# Patient Record
Sex: MALE | Race: WHITE | Employment: FULL TIME | URBAN - METROPOLITAN AREA
[De-identification: names, ages, dates, MRNs, and addresses within clinical notes are randomized per-mention and may not be internally consistent; named-entity substitution may affect disease eponyms.]

---

## 2017-02-21 ENCOUNTER — DOCUMENTATION ONLY (OUTPATIENT)
Dept: TRANSPLANT | Facility: CLINIC | Age: 57
End: 2017-02-21

## 2017-02-21 NOTE — PROGRESS NOTES
ISSUES: Hypertension & Immunosuppression           STAFF MESSAGE FROM DR. MCMILLAN     Received a call from [facility] in [place] caring for this patient.  Patient is new to their clinic.  Comes in with BP in 180s systolic a few weeks ago.  Creatinine was ~ 1.9 at that time, about his baseline.  He was started on lisinopril, but creatinine increased to 2.5 and lisinopril was stopped.  Now still with high BP.  Called for advice.     After reviewing multiple options and recommending he see a local nephrologist, I recommended he increase mycophenolate mofetil to 750 mg bid and decrease cyclosporine to 50 mg bid [changed to 50 mg daily from 100 mg daily]. After two weeks, he can stop cyclosporine and stay on mycophenolate mofetil 750 mg bid and prednisone 5 mg daily.     I choose to taper off cyclosporine because of history of gout and to allow him to be on nondihydropyridine CCB (diltiazem) without having to worry about interaction with CNI.     They were going to start this process. Can you please check in with patient about it and make any necessary adjustments.     He is unable to return for a visit and is referred to [facility] transplant center for a visit.        PHONE CALL:  Called patient to discuss Dr. Mcmillan's immunosuppression changes and alert him to expect a call from his primary care clinic about starting a new blood pressure medication. Instructions given regarding immunosuppression changes. Patient's cyclosporine dose was 100 mg daily, not twice a day. Dr. Mcmillan changed his cyclosporine dosing recommendation to 50 mg daily for 2 weeks then discontinue.

## 2017-08-24 ENCOUNTER — TRANSFERRED RECORDS (OUTPATIENT)
Dept: HEALTH INFORMATION MANAGEMENT | Facility: CLINIC | Age: 57
End: 2017-08-24

## 2020-05-11 ENCOUNTER — DOCUMENTATION ONLY (OUTPATIENT)
Dept: TRANSPLANT | Facility: CLINIC | Age: 60
End: 2020-05-11

## 2020-05-11 NOTE — PROGRESS NOTES
Received notification of failed Kidney from [facility] routine follow up  Fail is indicated by Preemptive re Transplant.  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

## 2020-11-14 ENCOUNTER — HEALTH MAINTENANCE LETTER (OUTPATIENT)
Age: 60
End: 2020-11-14

## 2021-03-28 ENCOUNTER — HEALTH MAINTENANCE LETTER (OUTPATIENT)
Age: 61
End: 2021-03-28

## 2021-07-18 ENCOUNTER — HEALTH MAINTENANCE LETTER (OUTPATIENT)
Age: 61
End: 2021-07-18

## 2021-09-12 ENCOUNTER — HEALTH MAINTENANCE LETTER (OUTPATIENT)
Age: 61
End: 2021-09-12

## 2021-11-07 ENCOUNTER — HEALTH MAINTENANCE LETTER (OUTPATIENT)
Age: 61
End: 2021-11-07

## 2022-01-02 ENCOUNTER — HEALTH MAINTENANCE LETTER (OUTPATIENT)
Age: 62
End: 2022-01-02

## 2022-02-27 ENCOUNTER — HEALTH MAINTENANCE LETTER (OUTPATIENT)
Age: 62
End: 2022-02-27

## 2022-06-19 ENCOUNTER — HEALTH MAINTENANCE LETTER (OUTPATIENT)
Age: 62
End: 2022-06-19

## 2022-11-19 ENCOUNTER — HEALTH MAINTENANCE LETTER (OUTPATIENT)
Age: 62
End: 2022-11-19

## 2023-04-08 ENCOUNTER — HEALTH MAINTENANCE LETTER (OUTPATIENT)
Age: 63
End: 2023-04-08

## 2023-09-09 ENCOUNTER — HEALTH MAINTENANCE LETTER (OUTPATIENT)
Age: 63
End: 2023-09-09

## 2024-01-27 ENCOUNTER — HEALTH MAINTENANCE LETTER (OUTPATIENT)
Age: 64
End: 2024-01-27